# Patient Record
Sex: MALE | Race: WHITE | URBAN - METROPOLITAN AREA
[De-identification: names, ages, dates, MRNs, and addresses within clinical notes are randomized per-mention and may not be internally consistent; named-entity substitution may affect disease eponyms.]

---

## 2020-11-05 ENCOUNTER — OFFICE VISIT (OUTPATIENT)
Dept: URBAN - METROPOLITAN AREA CLINIC 41 | Facility: CLINIC | Age: 80
End: 2020-11-05
Payer: MEDICARE

## 2020-11-05 PROCEDURE — 92014 COMPRE OPH EXAM EST PT 1/>: CPT | Performed by: OPHTHALMOLOGY

## 2020-11-05 PROCEDURE — 92134 CPTRZ OPH DX IMG PST SGM RTA: CPT | Performed by: OPHTHALMOLOGY

## 2020-11-05 PROCEDURE — 67028 INJECTION EYE DRUG: CPT | Performed by: OPHTHALMOLOGY

## 2020-11-05 ASSESSMENT — INTRAOCULAR PRESSURE
OD: 18
OS: 11

## 2020-11-05 NOTE — IMPRESSION/PLAN
Impression: Macular degeneration, dry OS. Status: Symptomatic. Plan: The patient notes blurring. Exam and OCT reveal no IRF. An IVFA from 01/16/2020 demonstrated no leakage. Fundus photos from 01/16/2020 demonstrated drusen. Observe. Discussed AREDS / I Vit and Jeffersonler.

## 2020-11-19 ENCOUNTER — OFFICE VISIT (OUTPATIENT)
Dept: URBAN - METROPOLITAN AREA CLINIC 7 | Facility: CLINIC | Age: 80
End: 2020-11-19
Payer: MEDICARE

## 2020-11-19 DIAGNOSIS — H35.3211 EXDTVE AGE-REL MCLR DEGN, RIGHT EYE, WITH ACTV CHRDL NEOVAS: ICD-10-CM

## 2020-11-19 PROCEDURE — 92014 COMPRE OPH EXAM EST PT 1/>: CPT | Performed by: OPHTHALMOLOGY

## 2020-11-19 RX ORDER — PREDNISOLONE ACETATE 10 MG/ML
1 % SUSPENSION/ DROPS OPHTHALMIC
Qty: 5 | Refills: 5 | Status: INACTIVE
Start: 2020-11-19 | End: 2021-02-16

## 2020-11-19 ASSESSMENT — INTRAOCULAR PRESSURE
OS: 11
OD: 12

## 2020-12-03 ENCOUNTER — OFFICE VISIT (OUTPATIENT)
Dept: URBAN - METROPOLITAN AREA CLINIC 41 | Facility: CLINIC | Age: 80
End: 2020-12-03
Payer: MEDICARE

## 2020-12-03 DIAGNOSIS — H20.9 UVEITIS: ICD-10-CM

## 2020-12-03 DIAGNOSIS — H43.11 VITREOUS HEMORRHAGE, RIGHT EYE: ICD-10-CM

## 2020-12-03 DIAGNOSIS — H04.123 DRY EYE SYNDROME OF BILATERAL LACRIMAL GLANDS: ICD-10-CM

## 2020-12-03 PROCEDURE — 67028 INJECTION EYE DRUG: CPT | Performed by: OPHTHALMOLOGY

## 2020-12-03 PROCEDURE — 92014 COMPRE OPH EXAM EST PT 1/>: CPT | Performed by: OPHTHALMOLOGY

## 2020-12-03 PROCEDURE — 92134 CPTRZ OPH DX IMG PST SGM RTA: CPT | Performed by: OPHTHALMOLOGY

## 2020-12-03 ASSESSMENT — INTRAOCULAR PRESSURE
OD: 13
OS: 9

## 2020-12-03 NOTE — IMPRESSION/PLAN
Impression: Macular degeneration, wet OD. Status: Symptomatic.
s/p Lucentis x 3 last 03/03/16
s/p Multiple Avastin last 11/05/2020 (consented 11/08/19) Plan: Exam and OCT reveal a small PED OD. An IVFA from 01/16/2020 demonstrated staining juxtafoveally. Fundus Photos from 01/16/2020 demonstrated drusen. Recommend Avastin. R/B/A discussed with patient. The risks of Avastin were discussed, including off-label use and compounding pharmacy risks, and the patient elects to proceed with Avastin. After 2% Subconjunctival anesthesia & Betadine prep, 1.25mg of Avastin was injected in the eye. Cold compress and Tylenol suggested for pain if needed. Thanks, Naveed Munoz Return in 6-8 weeks for follow up, OCT OU, IVFA OD 1st

## 2020-12-03 NOTE — IMPRESSION/PLAN
Impression: Uveitis, OD. Status: Symptomatic. Plan: Exam shows improving AC cell on PF QID and resolved VH OD. No s/s of infection. Observe.

## 2021-01-28 ENCOUNTER — OFFICE VISIT (OUTPATIENT)
Dept: URBAN - METROPOLITAN AREA CLINIC 41 | Facility: CLINIC | Age: 81
End: 2021-01-28
Payer: MEDICARE

## 2021-01-28 DIAGNOSIS — H25.13 AGE-RELATED NUCLEAR CATARACT, BILATERAL: ICD-10-CM

## 2021-01-28 PROCEDURE — 92014 COMPRE OPH EXAM EST PT 1/>: CPT | Performed by: OPHTHALMOLOGY

## 2021-01-28 PROCEDURE — 92134 CPTRZ OPH DX IMG PST SGM RTA: CPT | Performed by: OPHTHALMOLOGY

## 2021-01-28 PROCEDURE — 92235 FLUORESCEIN ANGRPH MLTIFRAME: CPT | Performed by: OPHTHALMOLOGY

## 2021-01-28 PROCEDURE — 67028 INJECTION EYE DRUG: CPT | Performed by: OPHTHALMOLOGY

## 2021-01-28 ASSESSMENT — INTRAOCULAR PRESSURE
OS: 12
OD: 17

## 2021-01-28 NOTE — IMPRESSION/PLAN
Impression: Macular degeneration, wet OD. Status: Symptomatic.
s/p Lucentis x 3 last 03/03/16
s/p Multiple Avastin last 12/3/2020 (consented 11/08/19) Plan: Exam and OCT reveal a small PED OD. An IVFA from 01/28/2021 demonstrated staining juxtafoveally. Fundus Photos from 01/28/2021 demonstrated drusen. Recommend Avastin. R/B/A discussed with patient. The risks of Avastin were discussed, including off-label use and compounding pharmacy risks, and the patient elects to proceed with Avastin. After 2% Subconjunctival anesthesia & Betadine prep, 1.25mg of Avastin was injected in the eye. Cold compress and Tylenol suggested for pain if needed. Thanks, Wade Linn Return in 6-8 weeks for follow up, OCT OU, possible Lucentis Tanna Reusing)

## 2021-01-28 NOTE — IMPRESSION/PLAN
Impression: Macular degeneration, dry OS. Status: Symptomatic. Plan: The patient notes blurring. Exam and OCT reveal no IRF. An IVFA from 01/28/2021 demonstrated no leakage. Fundus photos from 01/28/2021 demonstrated drusen. Observe. Discussed AREDS / I Vit and Stefani.

## 2021-03-11 ENCOUNTER — OFFICE VISIT (OUTPATIENT)
Dept: URBAN - METROPOLITAN AREA CLINIC 41 | Facility: CLINIC | Age: 81
End: 2021-03-11
Payer: MEDICARE

## 2021-03-11 DIAGNOSIS — H35.3123 NEXDTVE AGE-REL MCLR DEGN, L EYE, ADV ATRPC W/O SBFVL INVOLV: ICD-10-CM

## 2021-03-11 DIAGNOSIS — H01.004 UNSPECIFIED BLEPHARITIS LEFT UPPER EYELID: ICD-10-CM

## 2021-03-11 PROCEDURE — 67028 INJECTION EYE DRUG: CPT | Performed by: OPHTHALMOLOGY

## 2021-03-11 PROCEDURE — 92014 COMPRE OPH EXAM EST PT 1/>: CPT | Performed by: OPHTHALMOLOGY

## 2021-03-11 PROCEDURE — 92134 CPTRZ OPH DX IMG PST SGM RTA: CPT | Performed by: OPHTHALMOLOGY

## 2021-03-11 ASSESSMENT — INTRAOCULAR PRESSURE
OS: 10
OD: 14

## 2021-03-11 NOTE — IMPRESSION/PLAN
Impression: Macular degeneration, wet OD. Status: Symptomatic.
s/p Lucentis x 3 last 03/03/16
s/p Multiple Avastin last 01/28/2021 (consented 11/08/19) Plan: The patient notes worsening. Exam and OCT reveal a small PED OD. An IVFA from 01/28/2021 demonstrated staining juxtafoveally. Fundus Photos from 01/28/2021 demonstrated drusen. Recommend Lucentis. R/B/A discussed with patient. Patient elects to proceed with Lucentis 0.5mg today. After 2% Subconjunctival anesthesia & betadine prep, Lucentis was injected in the eye with no complications. Remainder discarded. Cold compress and Tylenol suggested for pain if needed. 

Return in 6-8 weeks for follow up, OCT OU, possible Lucentis

## 2021-04-22 ENCOUNTER — OFFICE VISIT (OUTPATIENT)
Dept: URBAN - METROPOLITAN AREA CLINIC 41 | Facility: CLINIC | Age: 81
End: 2021-04-22
Payer: MEDICARE

## 2021-04-22 PROCEDURE — 92014 COMPRE OPH EXAM EST PT 1/>: CPT | Performed by: OPHTHALMOLOGY

## 2021-04-22 PROCEDURE — 67028 INJECTION EYE DRUG: CPT | Performed by: OPHTHALMOLOGY

## 2021-04-22 PROCEDURE — 92134 CPTRZ OPH DX IMG PST SGM RTA: CPT | Performed by: OPHTHALMOLOGY

## 2021-04-22 ASSESSMENT — INTRAOCULAR PRESSURE
OS: 12
OD: 10

## 2021-04-22 NOTE — IMPRESSION/PLAN
Impression: Macular degeneration, wet OD. Status: Symptomatic.
s/p Lucentis x 4  last 03/11/2021 
s/p Multiple Avastin last 01/28/2021 (consented 11/08/19) Plan: Exam and OCT reveal a small PED OD. An IVFA from 01/28/2021 demonstrated staining juxtafoveally. Fundus Photos from 01/28/2021 demonstrated drusen. Recommend Lucentis. R/B/A discussed with patient. Patient elects to proceed with Lucentis 0.5mg today. After 2% Subconjunctival anesthesia & betadine prep, Lucentis was injected in the eye with no complications. Remainder discarded. Cold compress and Tylenol suggested for pain if needed. 

Return in 6-8 weeks for follow up, OCT OU, possible Lucentis, in [place]

## 2021-05-29 ENCOUNTER — RECORDS - HEALTHEAST (OUTPATIENT)
Dept: ADMINISTRATIVE | Facility: CLINIC | Age: 81
End: 2021-05-29

## 2021-05-30 ENCOUNTER — RECORDS - HEALTHEAST (OUTPATIENT)
Dept: ADMINISTRATIVE | Facility: CLINIC | Age: 81
End: 2021-05-30

## 2021-12-16 ENCOUNTER — OFFICE VISIT (OUTPATIENT)
Dept: URBAN - METROPOLITAN AREA CLINIC 41 | Facility: CLINIC | Age: 81
End: 2021-12-16
Payer: MEDICARE

## 2021-12-16 PROCEDURE — 67028 INJECTION EYE DRUG: CPT | Performed by: OPHTHALMOLOGY

## 2021-12-16 PROCEDURE — 92014 COMPRE OPH EXAM EST PT 1/>: CPT | Performed by: OPHTHALMOLOGY

## 2021-12-16 PROCEDURE — 92134 CPTRZ OPH DX IMG PST SGM RTA: CPT | Performed by: OPHTHALMOLOGY

## 2021-12-16 ASSESSMENT — INTRAOCULAR PRESSURE
OD: 15
OS: 12

## 2021-12-16 NOTE — IMPRESSION/PLAN
Impression: Macular degeneration, wet OD. Status: Symptomatic.
s/p Lucentis x 5  last 04/22/2021 
s/p Multiple Avastin last 01/28/2021
s/p Eylea last 10/2021 (in [place]) Plan: The patient was in [place] over the summer. Exam and OCT reveal a small PED OD. An IVFA from 01/28/2021 demonstrated staining juxtafoveally. Fundus Photos from 01/28/2021 demonstrated drusen. Recommend Lucentis. R/B/A discussed with patient. Patient elects to proceed with Lucentis 0.5mg today. After 2% Subconjunctival anesthesia & betadine prep, Lucentis was injected in the eye with no complications. Remainder discarded. Cold compress and Tylenol suggested for pain if needed. 

Return in 6-8 weeks for follow up, OCT OU, possible Lucentis, IVFA OD 1st

## 2022-01-27 ENCOUNTER — OFFICE VISIT (OUTPATIENT)
Dept: URBAN - METROPOLITAN AREA CLINIC 41 | Facility: CLINIC | Age: 82
End: 2022-01-27
Payer: MEDICARE

## 2022-01-27 PROCEDURE — 92235 FLUORESCEIN ANGRPH MLTIFRAME: CPT | Performed by: OPHTHALMOLOGY

## 2022-01-27 PROCEDURE — 92014 COMPRE OPH EXAM EST PT 1/>: CPT | Performed by: OPHTHALMOLOGY

## 2022-01-27 PROCEDURE — 92134 CPTRZ OPH DX IMG PST SGM RTA: CPT | Performed by: OPHTHALMOLOGY

## 2022-01-27 PROCEDURE — 67028 INJECTION EYE DRUG: CPT | Performed by: OPHTHALMOLOGY

## 2022-01-27 ASSESSMENT — INTRAOCULAR PRESSURE
OS: 10
OD: 13

## 2022-03-10 ENCOUNTER — OFFICE VISIT (OUTPATIENT)
Dept: URBAN - METROPOLITAN AREA CLINIC 41 | Facility: CLINIC | Age: 82
End: 2022-03-10
Payer: MEDICARE

## 2022-03-10 PROCEDURE — 67028 INJECTION EYE DRUG: CPT | Performed by: OPHTHALMOLOGY

## 2022-03-10 PROCEDURE — 92014 COMPRE OPH EXAM EST PT 1/>: CPT | Performed by: OPHTHALMOLOGY

## 2022-03-10 PROCEDURE — 92134 CPTRZ OPH DX IMG PST SGM RTA: CPT | Performed by: OPHTHALMOLOGY

## 2022-03-10 ASSESSMENT — INTRAOCULAR PRESSURE
OS: 10
OD: 13

## 2022-03-10 NOTE — IMPRESSION/PLAN
Impression: Macular degeneration, dry OS. Status: Symptomatic. Plan: The patient notes blurring. Exam and OCT reveal no IRF. An IVFA from 01/27/2022 demonstrated no leakage. Fundus photos from 01/27/2022 demonstrated drusen. Observe. Discussed AREDS / I Vit and Stefani.

## 2022-03-10 NOTE — IMPRESSION/PLAN
Impression: Macular degeneration, wet OD. Status: Symptomatic.
s/p Lucentis x 7  last 01/27/2022
s/p Multiple Avastin last 01/28/2021
s/p Eylea last 10/2021 (in [place]) Plan: The patient notes distortion. Exam and OCT reveal a small PED OD. An IVFA from 01/27/2022 demonstrated staining juxtafoveally. Fundus Photos from 01/27/2022 demonstrated drusen. Recommend Lucentis. R/B/A discussed with patient. Patient elects to proceed with Lucentis 0.5mg today. After 2% Subconjunctival anesthesia & betadine prep, Lucentis was injected in the eye with no complications. Remainder discarded. Cold compress and Tylenol suggested for pain if needed. 

Return in 6-8 weeks for follow up, OCT OU, possible Lucentis, then in [place]

## 2022-04-21 ENCOUNTER — OFFICE VISIT (OUTPATIENT)
Dept: URBAN - METROPOLITAN AREA CLINIC 41 | Facility: CLINIC | Age: 82
End: 2022-04-21
Payer: MEDICARE

## 2022-04-21 DIAGNOSIS — H04.123 DRY EYE SYNDROME OF BILATERAL LACRIMAL GLANDS: ICD-10-CM

## 2022-04-21 DIAGNOSIS — H35.3211 EXDTVE AGE-REL MCLR DEGN, RIGHT EYE, WITH ACTV CHRDL NEOVAS: Primary | ICD-10-CM

## 2022-04-21 DIAGNOSIS — H25.13 AGE-RELATED NUCLEAR CATARACT, BILATERAL: ICD-10-CM

## 2022-04-21 DIAGNOSIS — H20.9 UVEITIS: ICD-10-CM

## 2022-04-21 DIAGNOSIS — H01.004 UNSPECIFIED BLEPHARITIS LEFT UPPER EYELID: ICD-10-CM

## 2022-04-21 DIAGNOSIS — H35.3123 NEXDTVE AGE-REL MCLR DEGN, L EYE, ADV ATRPC W/O SBFVL INVOLV: ICD-10-CM

## 2022-04-21 PROCEDURE — 92134 CPTRZ OPH DX IMG PST SGM RTA: CPT | Performed by: OPHTHALMOLOGY

## 2022-04-21 PROCEDURE — 92014 COMPRE OPH EXAM EST PT 1/>: CPT | Performed by: OPHTHALMOLOGY

## 2022-04-21 ASSESSMENT — INTRAOCULAR PRESSURE
OS: 11
OD: 14

## 2022-04-21 NOTE — IMPRESSION/PLAN
Impression: Macular degeneration, wet OD. Status: Symptomatic.
s/p Lucentis x 8  last 03/10/2022 
s/p Multiple Avastin last 01/28/2021
s/p Eylea last 10/2021 (in [place]) Plan: The patient notes continued distortion. Exam and OCT reveal a small PED OD. An IVFA from 01/27/2022 demonstrated staining juxtafoveally. Fundus Photos from 01/27/2022 demonstrated drusen. Recommend Lucentis. R/B/A discussed with patient. Patient elects to proceed with Lucentis 0.5mg today. After 2% Subconjunctival anesthesia & betadine prep, Lucentis was injected in the eye with no complications. Remainder discarded. Cold compress and Tylenol suggested for pain if needed. 

Return in 6-8 weeks for follow up, OCT OU, possible Lucentis, in [place]

## 2022-12-07 ENCOUNTER — OFFICE VISIT (OUTPATIENT)
Dept: URBAN - METROPOLITAN AREA CLINIC 27 | Facility: CLINIC | Age: 82
End: 2022-12-07
Payer: MEDICARE

## 2022-12-07 DIAGNOSIS — H01.004 UNSPECIFIED BLEPHARITIS LEFT UPPER EYELID: ICD-10-CM

## 2022-12-07 DIAGNOSIS — H35.3123 NEXDTVE AGE-REL MCLR DEGN, L EYE, ADV ATRPC W/O SBFVL INVOLV: ICD-10-CM

## 2022-12-07 DIAGNOSIS — H20.9 UVEITIS: ICD-10-CM

## 2022-12-07 DIAGNOSIS — H35.3211 EXUDATIVE AGE-RELATED MACULAR DEGENERATION, RIGHT EYE, WITH ACTIVE CHOROIDAL NEOVASCULARIZATION: Primary | ICD-10-CM

## 2022-12-07 DIAGNOSIS — H25.13 AGE-RELATED NUCLEAR CATARACT, BILATERAL: ICD-10-CM

## 2022-12-07 DIAGNOSIS — H04.123 DRY EYE SYNDROME OF BILATERAL LACRIMAL GLANDS: ICD-10-CM

## 2022-12-07 PROCEDURE — 92134 CPTRZ OPH DX IMG PST SGM RTA: CPT | Performed by: OPHTHALMOLOGY

## 2022-12-07 PROCEDURE — 67028 INJECTION EYE DRUG: CPT | Performed by: OPHTHALMOLOGY

## 2022-12-07 PROCEDURE — 92014 COMPRE OPH EXAM EST PT 1/>: CPT | Performed by: OPHTHALMOLOGY

## 2022-12-07 ASSESSMENT — INTRAOCULAR PRESSURE
OS: 20
OD: 14

## 2022-12-07 NOTE — IMPRESSION/PLAN
Impression: Macular degeneration, wet OD. Status: Symptomatic.
s/p Lucentis x 9  last 04/21/2022 
s/p Multiple Avastin last 01/28/2021
s/p Eylea last 09/30/2022 (in [place]) Plan: Exam and OCT reveal a small PED OD. An IVFA from 01/27/2022 demonstrated staining juxtafoveally. Fundus Photos from 01/27/2022 demonstrated drusen. Recommend Lucentis. R/B/A discussed with patient. Patient elects to proceed with Lucentis 0.5mg today. After 2% Subconjunctival anesthesia & betadine prep, Lucentis was injected in the eye with no complications. Remainder discarded. Cold compress and Tylenol suggested for pain if needed. 

Return in 8 weeks for follow up, OCT OU, possible Lucentis, IVFA OD 1st

## 2023-01-21 NOTE — IMPRESSION/PLAN
Impression: Macular degeneration, wet OD. Status: Symptomatic.
s/p Lucentis x 3 last 03/03/16
s/p Multiple Avastin last 09/11/2020 (consented 11/08/19) Plan: The patient was in [place] over the summer. Exam and OCT reveal a small PED OD. An IVFA from 01/16/2020 demonstrated staining juxtafoveally. Fundus Photos from 01/16/2020 demonstrated drusen. Recommend Avastin. R/B/A discussed with patient. The risks of Avastin were discussed, including off-label use and compounding pharmacy risks, and the patient elects to proceed with Avastin. After 2% Subconjunctival anesthesia & Betadine prep, 1.25mg of Avastin was injected in the eye. Cold compress and Tylenol suggested for pain if needed. Chuy, Miracle Noriega Return in 8-9 weeks for follow up, OCT OU, IVFA OD 1st Adult

## 2023-02-09 ENCOUNTER — OFFICE VISIT (OUTPATIENT)
Dept: URBAN - METROPOLITAN AREA CLINIC 41 | Facility: CLINIC | Age: 83
End: 2023-02-09
Payer: MEDICARE

## 2023-02-09 DIAGNOSIS — H35.3211 EXDTVE AGE-REL MCLR DEGN, RIGHT EYE, WITH ACTV CHRDL NEOVAS: Primary | ICD-10-CM

## 2023-02-09 DIAGNOSIS — H35.3123 NEXDTVE AGE-REL MCLR DEGN, L EYE, ADV ATRPC W/O SBFVL INVOLV: ICD-10-CM

## 2023-02-09 DIAGNOSIS — H20.9 UVEITIS: ICD-10-CM

## 2023-02-09 DIAGNOSIS — H01.004 UNSPECIFIED BLEPHARITIS LEFT UPPER EYELID: ICD-10-CM

## 2023-02-09 DIAGNOSIS — H25.13 AGE-RELATED NUCLEAR CATARACT, BILATERAL: ICD-10-CM

## 2023-02-09 DIAGNOSIS — H04.123 DRY EYE SYNDROME OF BILATERAL LACRIMAL GLANDS: ICD-10-CM

## 2023-02-09 PROCEDURE — 67028 INJECTION EYE DRUG: CPT | Performed by: OPHTHALMOLOGY

## 2023-02-09 PROCEDURE — 92235 FLUORESCEIN ANGRPH MLTIFRAME: CPT | Performed by: OPHTHALMOLOGY

## 2023-02-09 PROCEDURE — 92134 CPTRZ OPH DX IMG PST SGM RTA: CPT | Performed by: OPHTHALMOLOGY

## 2023-02-09 PROCEDURE — 92014 COMPRE OPH EXAM EST PT 1/>: CPT | Performed by: OPHTHALMOLOGY

## 2023-02-09 ASSESSMENT — INTRAOCULAR PRESSURE
OS: 10
OD: 15

## 2023-02-09 NOTE — IMPRESSION/PLAN
Impression: Macular degeneration, wet OD. Status: Symptomatic.
s/p Lucentis x 10  last 12/07/2022
s/p Multiple Avastin last 01/28/2021
s/p Eylea last 09/30/2022 (in [place]) Plan: The patient notes blurring. Exam and OCT reveal a small PED OD. An IVFA from 02/09/2023 demonstrated staining juxtafoveally. Fundus Photos from 02/09/2023 demonstrated drusen. Recommend Lucentis. R/B/A discussed with patient. Patient elects to proceed with Lucentis 0.5mg today. After 2% Subconjunctival anesthesia & betadine prep, Lucentis was injected in the eye with no complications. Remainder discarded. Cold compress and Tylenol suggested for pain if needed. 

Return in 8 weeks for follow up, OCT OU, possible Lucentis

## 2023-02-09 NOTE — IMPRESSION/PLAN
Impression: Macular degeneration, dry OS. Status: Symptomatic. Plan: The patient notes blurring. Exam and OCT reveal no IRF. An IVFA from 02/09/2023 demonstrated no leakage. Fundus photos from 02/09/2023 demonstrated drusen. Observe. Discussed AREDS / I Vit and Jeffersonler.

## 2023-04-20 ENCOUNTER — OFFICE VISIT (OUTPATIENT)
Dept: URBAN - METROPOLITAN AREA CLINIC 41 | Facility: CLINIC | Age: 83
End: 2023-04-20
Payer: MEDICARE

## 2023-04-20 DIAGNOSIS — H25.13 AGE-RELATED NUCLEAR CATARACT, BILATERAL: ICD-10-CM

## 2023-04-20 DIAGNOSIS — H01.004 UNSPECIFIED BLEPHARITIS LEFT UPPER EYELID: ICD-10-CM

## 2023-04-20 DIAGNOSIS — H20.9 UVEITIS: ICD-10-CM

## 2023-04-20 DIAGNOSIS — H04.123 DRY EYE SYNDROME OF BILATERAL LACRIMAL GLANDS: ICD-10-CM

## 2023-04-20 DIAGNOSIS — H35.3211 EXDTVE AGE-REL MCLR DEGN, RIGHT EYE, WITH ACTV CHRDL NEOVAS: Primary | ICD-10-CM

## 2023-04-20 DIAGNOSIS — H35.3123 NEXDTVE AGE-REL MCLR DEGN, L EYE, ADV ATRPC W/O SBFVL INVOLV: ICD-10-CM

## 2023-04-20 PROCEDURE — 67028 INJECTION EYE DRUG: CPT | Performed by: OPHTHALMOLOGY

## 2023-04-20 PROCEDURE — 92134 CPTRZ OPH DX IMG PST SGM RTA: CPT | Performed by: OPHTHALMOLOGY

## 2023-04-20 PROCEDURE — 92014 COMPRE OPH EXAM EST PT 1/>: CPT | Performed by: OPHTHALMOLOGY

## 2023-04-20 ASSESSMENT — INTRAOCULAR PRESSURE
OD: 16
OS: 13

## 2023-04-20 NOTE — IMPRESSION/PLAN
Impression: Macular degeneration, dry OS. Status: Symptomatic. Plan: The patient notes blurring. Exam and OCT reveal no IRF. An IVFA from 02/09/2023 demonstrated no leakage. Fundus photos from 02/09/2023 demonstrated drusen. Observe. Discussed AREDS carotenoids  / I Vit and Amsler.

## 2023-04-20 NOTE — IMPRESSION/PLAN
Impression: Macular degeneration, wet OD. Status: Symptomatic.
s/p Lucentis x 11  last 02/09/2023
s/p Multiple Avastin last 01/28/2021
s/p Eylea last 09/30/2022 (in [place]) Plan: The patient notes continued blurring. Exam and OCT reveal a small PED OD. An IVFA from 02/09/2023 demonstrated staining juxtafoveally. Fundus Photos from 02/09/2023 demonstrated drusen. Recommend Lucentis. R/B/A discussed with patient. Patient elects to proceed with Lucentis 0.5mg today. After 2% Subconjunctival anesthesia & betadine prep, Lucentis was injected in the eye with no complications. Remainder discarded. Cold compress and Tylenol suggested for pain if needed. 

Return in 8 weeks for follow up, OCT OU, possible Lucentis, in [place]

## 2023-11-06 ENCOUNTER — OFFICE VISIT (OUTPATIENT)
Dept: URBAN - METROPOLITAN AREA CLINIC 41 | Facility: CLINIC | Age: 83
End: 2023-11-06
Payer: MEDICARE

## 2023-11-06 DIAGNOSIS — H35.3123 NEXDTVE AGE-REL MCLR DEGN, L EYE, ADV ATRPC W/O SBFVL INVOLV: ICD-10-CM

## 2023-11-06 DIAGNOSIS — H25.13 AGE-RELATED NUCLEAR CATARACT, BILATERAL: ICD-10-CM

## 2023-11-06 DIAGNOSIS — H35.3211 EXDTVE AGE-REL MCLR DEGN, RIGHT EYE, WITH ACTV CHRDL NEOVAS: Primary | ICD-10-CM

## 2023-11-06 DIAGNOSIS — H04.123 DRY EYE SYNDROME OF BILATERAL LACRIMAL GLANDS: ICD-10-CM

## 2023-11-06 DIAGNOSIS — H01.004 UNSPECIFIED BLEPHARITIS LEFT UPPER EYELID: ICD-10-CM

## 2023-11-06 DIAGNOSIS — H20.9 UVEITIS: ICD-10-CM

## 2023-11-06 PROCEDURE — 92134 CPTRZ OPH DX IMG PST SGM RTA: CPT | Performed by: OPHTHALMOLOGY

## 2023-11-06 PROCEDURE — 67028 INJECTION EYE DRUG: CPT | Performed by: OPHTHALMOLOGY

## 2023-11-06 PROCEDURE — 92014 COMPRE OPH EXAM EST PT 1/>: CPT | Performed by: OPHTHALMOLOGY

## 2023-11-06 ASSESSMENT — INTRAOCULAR PRESSURE
OD: 20
OS: 15

## 2023-11-22 NOTE — IMPRESSION/PLAN
Impression: Blepharitis, OU. Status: Symptomatic. Plan: WCS.
Impression: Cataracts, OU. Status: Symptomatic. Plan: Follow.
Impression: MAR [place]. Status: Symptomatic. Plan: AFTS.
Impression: Macular degeneration, dry OS. Status: Symptomatic. Plan: The patient notes blurring. Exam and OCT reveal no IRF. An IVFA from 01/27/2022 demonstrated no leakage. Fundus photos from 01/27/2022 demonstrated drusen. Observe. Discussed AREDS / I Vit and Stefani.
Impression: Macular degeneration, wet OD. Status: Symptomatic.
s/p Lucentis x 6  last 12/16/2021 
s/p Multiple Avastin last 01/28/2021
s/p Eylea last 10/2021 (in [place]) Plan: Exam and OCT reveal a small PED OD. An IVFA from 01/27/2022 demonstrated staining juxtafoveally. Fundus Photos from 01/27/2022 demonstrated drusen. Recommend Lucentis. R/B/A discussed with patient. Patient elects to proceed with Lucentis 0.5mg today. After 2% Subconjunctival anesthesia & betadine prep, Lucentis was injected in the eye with no complications. Remainder discarded. Cold compress and Tylenol suggested for pain if needed. 

Return in 6-8 weeks for follow up, OCT OU, possible Lucentis
Impression: Uveitis, OD. Status: Symptomatic. Plan: Exam shows improving AC cell on PF QID and resolved VH OD. No s/s of infection. Observe.
Adult

## 2024-01-12 ENCOUNTER — OFFICE VISIT (OUTPATIENT)
Dept: URBAN - METROPOLITAN AREA CLINIC 41 | Facility: CLINIC | Age: 84
End: 2024-01-12
Payer: MEDICARE

## 2024-01-12 DIAGNOSIS — H04.123 DRY EYE SYNDROME OF BILATERAL LACRIMAL GLANDS: ICD-10-CM

## 2024-01-12 DIAGNOSIS — H01.004 UNSPECIFIED BLEPHARITIS LEFT UPPER EYELID: ICD-10-CM

## 2024-01-12 DIAGNOSIS — H35.3123 NEXDTVE AGE-REL MCLR DEGN, L EYE, ADV ATRPC W/O SBFVL INVOLV: ICD-10-CM

## 2024-01-12 DIAGNOSIS — H35.3211 EXUDATIVE AGE-RELATED MACULAR DEGENERATION, RIGHT EYE, WITH ACTIVE CHOROIDAL NEOVASCULARIZATION: Primary | ICD-10-CM

## 2024-01-12 DIAGNOSIS — H25.13 AGE-RELATED NUCLEAR CATARACT, BILATERAL: ICD-10-CM

## 2024-01-12 DIAGNOSIS — H20.9 UVEITIS: ICD-10-CM

## 2024-01-12 PROCEDURE — 99214 OFFICE O/P EST MOD 30 MIN: CPT | Performed by: OPHTHALMOLOGY

## 2024-01-12 PROCEDURE — 92134 CPTRZ OPH DX IMG PST SGM RTA: CPT | Performed by: OPHTHALMOLOGY

## 2024-01-12 PROCEDURE — 67028 INJECTION EYE DRUG: CPT | Performed by: OPHTHALMOLOGY

## 2024-01-12 ASSESSMENT — INTRAOCULAR PRESSURE
OS: 5
OD: 7

## 2024-03-22 ENCOUNTER — OFFICE VISIT (OUTPATIENT)
Dept: URBAN - METROPOLITAN AREA CLINIC 41 | Facility: CLINIC | Age: 84
End: 2024-03-22
Payer: MEDICARE

## 2024-03-22 DIAGNOSIS — H01.004 UNSPECIFIED BLEPHARITIS LEFT UPPER EYELID: ICD-10-CM

## 2024-03-22 DIAGNOSIS — H04.123 DRY EYE SYNDROME OF BILATERAL LACRIMAL GLANDS: ICD-10-CM

## 2024-03-22 DIAGNOSIS — H20.9 UVEITIS: ICD-10-CM

## 2024-03-22 DIAGNOSIS — H35.3211 EXUDATIVE AGE-RELATED MACULAR DEGENERATION, RIGHT EYE, WITH ACTIVE CHOROIDAL NEOVASCULARIZATION: Primary | ICD-10-CM

## 2024-03-22 DIAGNOSIS — H25.13 AGE-RELATED NUCLEAR CATARACT, BILATERAL: ICD-10-CM

## 2024-03-22 DIAGNOSIS — H35.3123 NEXDTVE AGE-REL MCLR DEGN, L EYE, ADV ATRPC W/O SBFVL INVOLV: ICD-10-CM

## 2024-03-22 PROCEDURE — 67028 INJECTION EYE DRUG: CPT | Performed by: OPHTHALMOLOGY

## 2024-03-22 PROCEDURE — 99214 OFFICE O/P EST MOD 30 MIN: CPT | Performed by: OPHTHALMOLOGY

## 2024-03-22 PROCEDURE — 92134 CPTRZ OPH DX IMG PST SGM RTA: CPT | Performed by: OPHTHALMOLOGY

## 2024-03-22 ASSESSMENT — INTRAOCULAR PRESSURE
OS: 16
OD: 21

## 2024-12-27 ENCOUNTER — OFFICE VISIT (OUTPATIENT)
Dept: URBAN - METROPOLITAN AREA CLINIC 41 | Facility: CLINIC | Age: 84
End: 2024-12-27
Payer: MEDICARE

## 2024-12-27 DIAGNOSIS — H20.9 UVEITIS: ICD-10-CM

## 2024-12-27 DIAGNOSIS — H35.3123 NEXDTVE AGE-REL MCLR DEGN, L EYE, ADV ATRPC W/O SBFVL INVOLV: ICD-10-CM

## 2024-12-27 DIAGNOSIS — H01.004 UNSPECIFIED BLEPHARITIS LEFT UPPER EYELID: ICD-10-CM

## 2024-12-27 DIAGNOSIS — H35.3211 EXUDATIVE AGE-RELATED MACULAR DEGENERATION, RIGHT EYE, WITH ACTIVE CHOROIDAL NEOVASCULARIZATION: Primary | ICD-10-CM

## 2024-12-27 DIAGNOSIS — H04.123 DRY EYE SYNDROME OF BILATERAL LACRIMAL GLANDS: ICD-10-CM

## 2024-12-27 DIAGNOSIS — H25.13 AGE-RELATED NUCLEAR CATARACT, BILATERAL: ICD-10-CM

## 2024-12-27 PROCEDURE — 92134 CPTRZ OPH DX IMG PST SGM RTA: CPT | Performed by: OPHTHALMOLOGY

## 2024-12-27 PROCEDURE — 67028 INJECTION EYE DRUG: CPT | Performed by: OPHTHALMOLOGY

## 2024-12-27 PROCEDURE — 92014 COMPRE OPH EXAM EST PT 1/>: CPT | Performed by: OPHTHALMOLOGY

## 2024-12-27 ASSESSMENT — INTRAOCULAR PRESSURE
OD: 12
OS: 10